# Patient Record
Sex: FEMALE | Race: AMERICAN INDIAN OR ALASKA NATIVE
[De-identification: names, ages, dates, MRNs, and addresses within clinical notes are randomized per-mention and may not be internally consistent; named-entity substitution may affect disease eponyms.]

---

## 2020-08-20 ENCOUNTER — HOSPITAL ENCOUNTER (OUTPATIENT)
Dept: HOSPITAL 5 - CARD | Age: 60
Discharge: HOME | End: 2020-08-20
Attending: INTERNAL MEDICINE
Payer: COMMERCIAL

## 2020-08-20 DIAGNOSIS — R94.31: Primary | ICD-10-CM

## 2020-08-20 PROCEDURE — 93017 CV STRESS TEST TRACING ONLY: CPT

## 2020-08-20 NOTE — TREADMILL REPORT
TREADMILL STRESS TEST



ORDERING PHYSICIAN:  Dr. Berry.



REASON FOR TREADMILL:  Abnormal EKG.



FINDINGS:  The patient's baseline heart rate was 80, baseline blood pressure

150/79.  Baseline EKG, sinus rhythm with RSR pattern in V1 and V2.  The patient

exercised on Sanya protocol for 6 minutes.  Peak heart rate was 160, which is

88% max MPHR.  Peak blood pressure is 155/90.  The patient had no EKG

changes or arrhythmia suggestive of ischemia, stopped secondary to shortness of

breath.



SUMMARY:

1.  Negative treadmill EKG.

2.  Fair exercise capacity 6 minutes Sanya protocol.

3.  No exaggerated BP response to exercise.

4.  There is no diagnostic EKG changes or arrhythmia suggestive of ischemia.





DD: 08/20/2020 10:58

DT: 08/20/2020 13:13

JOB# [id number]  [id number]

MABEL/MARILEE JACKSON